# Patient Record
Sex: FEMALE | Race: WHITE | Employment: FULL TIME | URBAN - METROPOLITAN AREA
[De-identification: names, ages, dates, MRNs, and addresses within clinical notes are randomized per-mention and may not be internally consistent; named-entity substitution may affect disease eponyms.]

---

## 2019-08-05 PROBLEM — E66.01 SEVERE OBESITY (HCC): Status: ACTIVE | Noted: 2019-08-05

## 2020-11-17 PROCEDURE — 88305 TISSUE EXAM BY PATHOLOGIST: CPT

## 2020-11-18 ENCOUNTER — HOSPITAL ENCOUNTER (OUTPATIENT)
Dept: LAB | Age: 35
Discharge: HOME | End: 2020-11-18

## 2022-03-19 PROBLEM — E66.01 SEVERE OBESITY (HCC): Status: ACTIVE | Noted: 2019-08-05

## 2022-12-05 RX ORDER — LEVOTHYROXINE SODIUM 0.07 MG/1
TABLET ORAL
Qty: 90 TABLET | Refills: 3 | OUTPATIENT
Start: 2022-12-05

## 2022-12-05 RX ORDER — LEVOTHYROXINE SODIUM 0.07 MG/1
75 TABLET ORAL
Qty: 90 TABLET | Refills: 0 | Status: SHIPPED | OUTPATIENT
Start: 2022-12-05

## 2022-12-09 DIAGNOSIS — N92.1 EXCESSIVE AND FREQUENT MENSTRUATION WITH IRREGULAR CYCLE: Primary | ICD-10-CM

## 2022-12-12 RX ORDER — NORETHINDRONE ACETATE AND ETHINYL ESTRADIOL AND FERROUS FUMARATE 1.5-30(21)
KIT ORAL
Qty: 84 TABLET | Refills: 0 | Status: SHIPPED | OUTPATIENT
Start: 2022-12-12

## 2022-12-30 NOTE — PROGRESS NOTES
HPI    [address] is a 42 y.o. female seen for annual GYN exam.  She is now taking allergy injections for multiple food allergies/grass/weeds  allergies x 1 week. No gyn issues. Her menstrual migraines appear to have resolved after dietary changes due to allergies that started after COVID. She is no longer using the CombiPatch and week #4 of her pills    She also will have a TSH with reflex today and refill of her levothyroxine. The patient's son is now at [employer]. Her 's grandfather started [employer]. Past Medical History, Past Surgical History, Family history, Social History, and Medications were all reviewed with the patient today and updated as necessary. Current Outpatient Medications   Medication Sig    cetirizine (ZYRTEC) 10 MG tablet Take 10 mg by mouth daily    levothyroxine (SYNTHROID) 75 MCG tablet Take 1 tablet by mouth every morning (before breakfast)    ELOISA FE 1.5/30 1.5-30 MG-MCG tablet TAKE ONE TABLET BY MOUTH ONE TIME DAILY    diclofenac (VOLTAREN) 25 MG EC tablet Take 25 mg by mouth 2 times daily     No current facility-administered medications for this visit.      Allergies   Allergen Reactions    Ciprofloxacin Other (See Comments)     Pt passes out    Hydrocodone-Acetaminophen Other (See Comments)    Penicillins Other (See Comments)     Past Medical History:   Diagnosis Date    ADD (attention deficit disorder)     Calculus of kidney     COVID-19 virus detected 12/11/2020    Hypothyroidism     Menstrual migraine     Migraines     Weight gain      Past Surgical History:   Procedure Laterality Date    GYN  11/17/2020    Endometrial Biopsy-Benign results    LITHOTRIPSY      ORTHOPEDIC SURGERY      Left knee x 1    ORTHOPEDIC SURGERY      Right Knee x 3      Family History   Problem Relation Age of Onset    Breast Cancer Maternal Grandmother 80      Social History     Tobacco Use    Smoking status: Never    Smokeless tobacco: Never   Substance Use Topics    Alcohol use: No     Alcohol/week: 0.0 standard drinks       Social History     Substance and Sexual Activity   Sexual Activity Yes    Birth control/protection: Pill     OB History    Para Term  AB Living   2 0 2 0 0 2   SAB IAB Ectopic Molar Multiple Live Births   0 0 0 0 0 0       Health Maintenance  Mammogram:   Colonoscopy:   Bone Density:  Pap smear: 11/15/21       Review of Systems  General: Not Present- Chills, Fever, Fatigue, Insomnia, Hot flashes/Night sweats, Weight gain  Skin: Not Present- Bruising, Change in Wart/Mole, Excessive Sweating, Itching, Nail Changes, New Lesions, Rash, Skin Color Changes and Ulcer. HEENT: Not Present- Headache, Blurred Vision, Double Vision, Glaucoma, Visual Disturbances, Hearing Loss, Ringing in the Ears, Vertigo, Nose Bleed, Bleeding Gums, Hoarseness and Sore Throat. Neck: Not Present- Neck Pain and Neck Swelling. Respiratory: Not Present- Cough, Difficulty Breathing and Difficulty Breathing on Exertion. Breast: Not Present- Breast Mass, Breast Pain, Breast Swelling, Nipple Discharge, Nipple Pain, Recent Breast Size Changes and Skin Changes. Cardiovascular: Not Present- Abnormal Blood Pressure, Chest Pain, Edema, Fainting / Blacking Out, Palpitations, Shortness of Breath and Swelling of Extremities. Gastrointestinal: Not Present- Abdominal Pain, Abdominal Swelling, Bloating, Change in Bowel Habits, Constipation, Diarrhea, Difficulty Swallowing, Gets full quickly at meals, Nausea, Rectal Bleeding and Vomiting. Female Genitourinary: Not Present- Dysmenorrhea, Dyspareunia, Decreased libido, Excessive Menstrual Bleeding, Menstrual Irregularities, Pelvic Pain, Urinary Complaints, Vaginal Discharge, Vaginal itching/burning, Vaginal odor  Musculoskeletal: Not Present- Joint Pain and Muscle Pain. Neurological: Not Present- Dizziness, Fainting, Headaches and Seizures.   Psychiatric: Not Present- Anxiety, Depression, Mood changes and Panic Attacks. Endocrine: Not Present- Appetite Changes, Cold Intolerance, Excessive Thirst, Excessive Urination and Heat Intolerance. Hematology: Not Present- Abnormal Bleeding, Easy Bruising and Enlarged Lymph Nodes. PHYSICAL EXAM:     /80 (Site: Left Upper Arm, Position: Sitting)   Ht 5' 3\" (1.6 m)   Wt 231 lb (104.8 kg)   LMP 12/07/2022 (Exact Date) Comment: Very light periods  BMI 40.92 kg/m²     Physical Exam   General   Mental Status - Alert. General Appearance - Cooperative. Integumentary   General Characteristics: Overall examination of the patient's skin reveals - no rashes and no suspicious lesions. Head and Neck  Head - normocephalic, atraumatic with no lesions or palpable masses. Neck Note: Normal   Thyroid   Gland Characteristics - normal size and consistency and no palpable nodules. Chest and Lung Exam   Chest and lung exam reveals - on auscultation, normal breath sounds, no adventitious sounds and normal vocal resonance. Breast   Breast - Left - Normal.  There is a little tender ropey change in the lateral part of the left breast.  She will have a mammogram in several months when she turns 38. . Right - Normal.     Cardiovascular   Cardiovascular examination reveals - normal heart sounds, regular rate and rhythm with no murmurs. Abdomen   Inspection: - Inspection Normal.   Palpation/Percussion: Palpation and Percussion of the abdomen reveal - Non Tender, No Rebound tenderness, No Rigidity (guarding), No hepatosplenomegaly, No Palpable abdominal masses and Soft. Auscultation: Auscultation of the abdomen reveals - Bowel sounds normal.     Female Genitourinary     External Genitalia   Vulva: - Normal. Perineum - Normal. Bartholin's Gland - Bilateral - Normal. Clitoris - Normal.   Introitus: Characteristics - Normal.   Urethra: Characteristics - Normal.     Speculum & Bimanual   Vagina: Vaginal Mucosa - Normal.   Vaginal Wall: - Normal.   Vaginal Lesions - None. Cervix: Characteristics - Normal.   Uterus: Characteristics - Normal.   Adnexa: - Normal.   Bladder - Normal.     Peripheral Vascular   Normal    Neuropsychiatric   Examination of related systems reveals - The patient is well-nourished and well-groomed. Mental status exam performed with findings of - Oriented X3 with appropriate mood and affect. Musculoskeletal  Normal      General Lymphatics  Normal           Medical problems and test results were reviewed with the patient today. ASSESSMENT and PLAN    1. Well woman exam with routine gynecological exam  2. Screening for malignant neoplasm of cervix  -     PAP LB, Reflex HPV ASCUS ([id number])  3. Menstrual migraine without status migrainosus, not intractable  4. Screening mammogram for breast cancer  -     [facility] HELDER DIGITAL SCREEN BILATERAL; Future  5. Hypothyroidism, unspecified type  -     TSH with Reflex; Future  -     levothyroxine (SYNTHROID) 75 MCG tablet; Take 1 tablet by mouth every morning (before breakfast), Disp-90 tablet, R-4Normal         No follow-ups on file.        Michell Prasad MD  1/3/2023

## 2023-01-03 ENCOUNTER — OFFICE VISIT (OUTPATIENT)
Dept: GYNECOLOGY | Age: 38
End: 2023-01-03
Payer: COMMERCIAL

## 2023-01-03 VITALS
SYSTOLIC BLOOD PRESSURE: 126 MMHG | BODY MASS INDEX: 40.93 KG/M2 | WEIGHT: 231 LBS | DIASTOLIC BLOOD PRESSURE: 80 MMHG | HEIGHT: 63 IN

## 2023-01-03 DIAGNOSIS — Z12.31 SCREENING MAMMOGRAM FOR BREAST CANCER: ICD-10-CM

## 2023-01-03 DIAGNOSIS — E03.9 HYPOTHYROIDISM, UNSPECIFIED TYPE: ICD-10-CM

## 2023-01-03 DIAGNOSIS — Z12.4 SCREENING FOR MALIGNANT NEOPLASM OF CERVIX: ICD-10-CM

## 2023-01-03 DIAGNOSIS — G43.829 MENSTRUAL MIGRAINE WITHOUT STATUS MIGRAINOSUS, NOT INTRACTABLE: ICD-10-CM

## 2023-01-03 DIAGNOSIS — Z01.419 WELL WOMAN EXAM WITH ROUTINE GYNECOLOGICAL EXAM: Primary | ICD-10-CM

## 2023-01-03 PROCEDURE — G8484 FLU IMMUNIZE NO ADMIN: HCPCS | Performed by: OBSTETRICS & GYNECOLOGY

## 2023-01-03 PROCEDURE — 99395 PREV VISIT EST AGE 18-39: CPT | Performed by: OBSTETRICS & GYNECOLOGY

## 2023-01-03 RX ORDER — LEVOTHYROXINE SODIUM 0.07 MG/1
75 TABLET ORAL
Qty: 90 TABLET | Refills: 4 | Status: SHIPPED | OUTPATIENT
Start: 2023-01-03

## 2023-01-03 RX ORDER — CETIRIZINE HYDROCHLORIDE 10 MG/1
10 TABLET ORAL DAILY
COMMUNITY

## 2023-01-05 LAB
CYTOLOGIST CVX/VAG CYTO: NORMAL
CYTOLOGY CVX/VAG DOC THIN PREP: NORMAL
HPV REFLEX: NORMAL
Lab: NORMAL
PATH REPORT.FINAL DX SPEC: NORMAL
STAT OF ADQ CVX/VAG CYTO-IMP: NORMAL

## 2023-03-04 DIAGNOSIS — N92.1 EXCESSIVE AND FREQUENT MENSTRUATION WITH IRREGULAR CYCLE: ICD-10-CM

## 2023-03-06 RX ORDER — NORETHINDRONE ACETATE AND ETHINYL ESTRADIOL AND FERROUS FUMARATE 1.5-30(21)
KIT ORAL
Qty: 84 TABLET | Refills: 4 | Status: SHIPPED | OUTPATIENT
Start: 2023-03-06

## 2023-07-10 ENCOUNTER — HOSPITAL ENCOUNTER (OUTPATIENT)
Dept: MAMMOGRAPHY | Age: 38
Discharge: HOME | End: 2023-07-13
Payer: COMMERCIAL

## 2023-07-10 DIAGNOSIS — Z12.31 SCREENING MAMMOGRAM FOR BREAST CANCER: ICD-10-CM

## 2023-07-10 PROCEDURE — 77063 BREAST TOMOSYNTHESIS BI: CPT

## 2024-03-04 DIAGNOSIS — E03.9 HYPOTHYROIDISM, UNSPECIFIED TYPE: ICD-10-CM

## 2024-03-05 RX ORDER — LEVOTHYROXINE SODIUM 0.07 MG/1
75 TABLET ORAL
Qty: 90 TABLET | Refills: 4 | OUTPATIENT
Start: 2024-03-05